# Patient Record
Sex: MALE | Race: WHITE | NOT HISPANIC OR LATINO | URBAN - METROPOLITAN AREA
[De-identification: names, ages, dates, MRNs, and addresses within clinical notes are randomized per-mention and may not be internally consistent; named-entity substitution may affect disease eponyms.]

---

## 2017-06-20 RX ORDER — APIXABAN 2.5 MG/1
1 TABLET, FILM COATED ORAL
Qty: 0 | Refills: 0 | COMMUNITY
Start: 2017-06-20

## 2017-07-07 ENCOUNTER — OUTPATIENT (OUTPATIENT)
Dept: OUTPATIENT SERVICES | Facility: HOSPITAL | Age: 82
LOS: 1 days | Discharge: HOME | End: 2017-07-07
Payer: MEDICARE

## 2017-07-07 DIAGNOSIS — Z96.641 PRESENCE OF RIGHT ARTIFICIAL HIP JOINT: Chronic | ICD-10-CM

## 2017-07-07 DIAGNOSIS — I48.1 PERSISTENT ATRIAL FIBRILLATION: ICD-10-CM

## 2017-07-07 DIAGNOSIS — Z96.651 PRESENCE OF RIGHT ARTIFICIAL KNEE JOINT: Chronic | ICD-10-CM

## 2017-07-07 DIAGNOSIS — Z87.828 PERSONAL HISTORY OF OTHER (HEALED) PHYSICAL INJURY AND TRAUMA: Chronic | ICD-10-CM

## 2017-07-07 DIAGNOSIS — I48.91 UNSPECIFIED ATRIAL FIBRILLATION: ICD-10-CM

## 2017-07-07 PROBLEM — Z00.00 ENCOUNTER FOR PREVENTIVE HEALTH EXAMINATION: Status: ACTIVE | Noted: 2017-07-07

## 2017-07-07 LAB
ALBUMIN SERPL ELPH-MCNC: 3.7 G/DL — SIGNIFICANT CHANGE UP (ref 3.3–5)
ALP SERPL-CCNC: 288 U/L — HIGH (ref 40–120)
ALT FLD-CCNC: 98 U/L — HIGH (ref 10–45)
ANION GAP SERPL CALC-SCNC: 12 MMOL/L — SIGNIFICANT CHANGE UP (ref 5–17)
APTT BLD: 32.4 SEC — SIGNIFICANT CHANGE UP (ref 27.5–37.4)
AST SERPL-CCNC: 34 U/L — SIGNIFICANT CHANGE UP (ref 10–40)
BILIRUB SERPL-MCNC: 0.7 MG/DL — SIGNIFICANT CHANGE UP (ref 0.2–1.2)
BUN SERPL-MCNC: 22 MG/DL — SIGNIFICANT CHANGE UP (ref 7–23)
CALCIUM SERPL-MCNC: 9.1 MG/DL — SIGNIFICANT CHANGE UP (ref 8.4–10.5)
CHLORIDE SERPL-SCNC: 93 MMOL/L — LOW (ref 96–108)
CO2 SERPL-SCNC: 30 MMOL/L — SIGNIFICANT CHANGE UP (ref 22–31)
CREAT SERPL-MCNC: 0.9 MG/DL — SIGNIFICANT CHANGE UP (ref 0.5–1.3)
GLUCOSE SERPL-MCNC: 89 MG/DL — SIGNIFICANT CHANGE UP (ref 70–99)
HCT VFR BLD CALC: 40.5 % — SIGNIFICANT CHANGE UP (ref 39–50)
HGB BLD-MCNC: 13.8 G/DL — SIGNIFICANT CHANGE UP (ref 13–17)
INR BLD: 1.58 — HIGH (ref 0.88–1.16)
MCHC RBC-ENTMCNC: 30.9 PG — SIGNIFICANT CHANGE UP (ref 27–34)
MCHC RBC-ENTMCNC: 34.1 G/DL — SIGNIFICANT CHANGE UP (ref 32–36)
MCV RBC AUTO: 90.8 FL — SIGNIFICANT CHANGE UP (ref 80–100)
PLATELET # BLD AUTO: 348 K/UL — SIGNIFICANT CHANGE UP (ref 150–400)
POTASSIUM SERPL-MCNC: 4.1 MMOL/L — SIGNIFICANT CHANGE UP (ref 3.5–5.3)
POTASSIUM SERPL-SCNC: 4.1 MMOL/L — SIGNIFICANT CHANGE UP (ref 3.5–5.3)
PROT SERPL-MCNC: 7.5 G/DL — SIGNIFICANT CHANGE UP (ref 6–8.3)
PROTHROM AB SERPL-ACNC: 17.7 SEC — HIGH (ref 9.8–12.7)
RBC # BLD: 4.46 M/UL — SIGNIFICANT CHANGE UP (ref 4.2–5.8)
RBC # FLD: 14.7 % — SIGNIFICANT CHANGE UP (ref 10.3–16.9)
SODIUM SERPL-SCNC: 135 MMOL/L — SIGNIFICANT CHANGE UP (ref 135–145)
WBC # BLD: 8.1 K/UL — SIGNIFICANT CHANGE UP (ref 3.8–10.5)
WBC # FLD AUTO: 8.1 K/UL — SIGNIFICANT CHANGE UP (ref 3.8–10.5)

## 2017-07-07 PROCEDURE — 92960 CARDIOVERSION ELECTRIC EXT: CPT

## 2017-07-07 PROCEDURE — 80053 COMPREHEN METABOLIC PANEL: CPT

## 2017-07-07 PROCEDURE — 93312 ECHO TRANSESOPHAGEAL: CPT | Mod: 26

## 2017-07-07 PROCEDURE — 93312 ECHO TRANSESOPHAGEAL: CPT

## 2017-07-07 PROCEDURE — 93320 DOPPLER ECHO COMPLETE: CPT | Mod: 26

## 2017-07-07 PROCEDURE — 85610 PROTHROMBIN TIME: CPT

## 2017-07-07 PROCEDURE — 85027 COMPLETE CBC AUTOMATED: CPT

## 2017-07-07 PROCEDURE — 93325 DOPPLER ECHO COLOR FLOW MAPG: CPT | Mod: 26

## 2017-07-07 PROCEDURE — 85730 THROMBOPLASTIN TIME PARTIAL: CPT

## 2017-07-07 RX ORDER — FUROSEMIDE 40 MG
1 TABLET ORAL
Qty: 0 | Refills: 0 | COMMUNITY

## 2017-07-07 RX ORDER — ATORVASTATIN CALCIUM 80 MG/1
1 TABLET, FILM COATED ORAL
Qty: 0 | Refills: 0 | COMMUNITY

## 2017-07-07 RX ORDER — TRAZODONE HCL 50 MG
1 TABLET ORAL
Qty: 0 | Refills: 0 | COMMUNITY

## 2017-07-07 RX ORDER — L.ACIDOPH/B.ANIMALIS/B.LONGUM 15B CELL
1 CAPSULE ORAL
Qty: 0 | Refills: 0 | COMMUNITY

## 2017-07-07 RX ORDER — DIPHENOXYLATE HCL/ATROPINE 2.5-.025MG
1 TABLET ORAL
Qty: 0 | Refills: 0 | COMMUNITY

## 2017-07-07 RX ORDER — BISOPROLOL FUMARATE 10 MG/1
2 TABLET, FILM COATED ORAL
Qty: 0 | Refills: 0 | COMMUNITY

## 2017-07-07 RX ORDER — CHOLECALCIFEROL (VITAMIN D3) 125 MCG
1 CAPSULE ORAL
Qty: 0 | Refills: 0 | COMMUNITY
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
## 2017-07-07 NOTE — H&P ADULT - NSHPSOCIALHISTORY_GEN_ALL_CORE
Patient lives with his wife Hellen. He is very active, plays golf, tennis etc.   Tobacco: Former social smoker (1/2 ppd for 5-10 years).   Etoh: social drinker (occasional glass of wine)  Drugs: Denies illicit drug use

## 2017-07-07 NOTE — H&P ADULT - NSHPLABSRESULTS_GEN_ALL_CORE
13.8   8.1   )-----------( 348      ( 07 Jul 2017 11:33 )             40.5   07-07    135  |  93<L>  |  22  ----------------------------<  89  4.1   |  30  |  0.90    Ca    9.1      07 Jul 2017 11:33    TPro  7.5  /  Alb  3.7  /  TBili  0.7  /  DBili  x   /  AST  34  /  ALT  98<H>  /  AlkPhos  288<H>  07-07    PT/INR - ( 07 Jul 2017 11:33 )   PT: 17.7 sec;   INR: 1.58     PTT - ( 07 Jul 2017 11:33 )  PTT:32.4 sec    CALRKE 07/07/17  Interpretation Summary  Normal left ventricular size and wall thickness. There is mild global  hypokinesis of the left ventricle. The left ventricular ejection fraction is mildly reduced. The left atrium is dilated. No clot seen in the left atrium or in the left atrial appendage. Left atrial appendage emptying velocities are reduced. The right atrium is dilated. The right ventricle is normal in size and  function. Structurally normal aortic valve. No aortic regurgitation noted. Structurally normal mitral valve. There is mild mitral regurgitation. Structurally normal tricuspid valve. Structurally normal pulmonic valve. Mild atherosclerotic plaque(s) in the aortic arch. Mild atherosclerotic plaque(s) in the descending aorta.

## 2017-07-07 NOTE — H&P ADULT - ASSESSMENT
Mr. Watkins is an active 82yo male with a past medical history of basal and squamous cell carcinoma, arthritis, hearing loss, and newly diagnosed atrial fibrillation (06/15) who presents today for CLARKE/ Cardioversion. Mr. Watkins is an active 82yo male with a past medical history of basal and squamous cell carcinoma, arthritis, hearing loss, and newly diagnosed  persistent atrial fibrillation (06/15) who presents today for CLARKE/ Cardioversion.

## 2017-07-07 NOTE — H&P ADULT - HISTORY OF PRESENT ILLNESS
Mr. Watkins is an active 82yo male with a past medical history of arthritis, hearing loss, and newly diagnosed atrial fibrillation (06/15)who presents today for CLARKE/ Cardioversion.     The patient was in his usual state of health until 06/15/17 when he experienced flu-like symptoms including fever, chills, rigors, malaise. He also felt fatigue, dyspnea on exertion, and just generally unwell. He went to his PCP and was found to be atrial fibrillation. Mr. Watkins is an active 84yo male with a past medical history of arthritis, hearing loss, and newly diagnosed atrial fibrillation (06/15) who presents today for CLARKE/ Cardioversion.     The patient was in his usual state of health until 06/15/17 when he experienced flu-like symptoms including fever, chills, rigors, malaise and severe headache. He also felt fatigue and increasing dyspnea on exertion. He went to his PCP and was found to be atrial fibrillation. He was started on Eliquis at that time and has been compliant with this medication. His PCP is in the process of a work-up for possible Lyme disease infection.    The patient denies palpitations, syncope, near syncope, chest pain or pressure, dizziness, light-headedness. The patient also denies history of hypertension and hyperlipidemia, despite currently being precribed a statin and Mr. Watkins is an active 82yo male with a past medical history of basal and squamous cell carcinoma, arthritis, hearing loss, and newly diagnosed atrial fibrillation (06/15) who presents today for CLARKE/ Cardioversion.     The patient was in his usual state of health until 06/15/17 when he experienced flu-like symptoms including fever, chills, rigors, malaise and severe headache. He also felt fatigue and increasing dyspnea on exertion. He went to his PCP and was found to be in atrial fibrillation. He was started on Cefuroxime, Diltiazem, Eliquis and prednisone at that time and has been compliant with all medications. His PCP is in the process of a work-up for possible Lyme disease infection. Prior to 06/15 the patient was very active, played golf, tennis, and hiking regularly. Since 06/15 the patient has been limited in his exertional tolerance and now feels deconditioned and weak.     The patient denies palpitations, syncope, near syncope, chest pain or pressure, dizziness, and/ or light-headedness. The patient also denies history of hypertension, hyperlipidemia, and constipation despite currently being prescribed a beta-blocker, statin, and lomotil, respectively. Mr. Watkins is an active 82 yo male with a past medical history of basal and squamous cell carcinoma, arthritis, hearing loss, and newly diagnosed atrial fibrillation (06/15) who presents today for CLARKE/ Cardioversion.     The patient was in his usual state of health until 06/15/17 when he experienced flu-like symptoms including fever, chills, rigors, malaise and severe headache. He also felt fatigue and increasing dyspnea on exertion. He went to his PCP and was found to be in atrial fibrillation. He was started on Cefuroxime, Diltiazem, Eliquis and prednisone at that time and has been compliant with all medications. His PCP is in the process of a work-up for possible Lyme disease infection. Prior to 06/15 the patient was very active, played golf, tennis, and hiking regularly. Since 06/15 the patient has been limited in his exertional tolerance and now feels deconditioned and weak.     The patient denies palpitations, syncope, near syncope, chest pain or pressure, dizziness, and/ or light-headedness. The patient also denies history of hypertension, hyperlipidemia, and constipation despite currently being prescribed a beta-blocker, statin, and lomotil, respectively.

## 2017-07-07 NOTE — H&P ADULT - OPHTHALMOLOGIC COMMENTS
Right eyes - floater, Left sided mild vision loss (states that vision is "darker in left eye") Right eye - floater, Left eye mild vision loss (states that vision is "darker in left eye")

## 2017-07-07 NOTE — H&P ADULT - FAMILY HISTORY
Father  Still living? Unknown  Family history of myocardial infarction in first degree male relative, Age at diagnosis: Age Unknown     Mother  Still living? Unknown  Family history of Parkinson's disease, Age at diagnosis: Age Unknown

## 2017-08-17 ENCOUNTER — APPOINTMENT (OUTPATIENT)
Dept: HEART AND VASCULAR | Facility: CLINIC | Age: 82
End: 2017-08-17
Payer: MEDICARE

## 2017-08-17 VITALS
BODY MASS INDEX: 21.42 KG/M2 | HEART RATE: 57 BPM | SYSTOLIC BLOOD PRESSURE: 139 MMHG | WEIGHT: 153 LBS | HEIGHT: 71 IN | DIASTOLIC BLOOD PRESSURE: 71 MMHG

## 2017-08-17 DIAGNOSIS — Z87.891 PERSONAL HISTORY OF NICOTINE DEPENDENCE: ICD-10-CM

## 2017-08-17 DIAGNOSIS — I48.0 PAROXYSMAL ATRIAL FIBRILLATION: ICD-10-CM

## 2017-08-17 DIAGNOSIS — Z78.9 OTHER SPECIFIED HEALTH STATUS: ICD-10-CM

## 2017-08-17 PROCEDURE — 93000 ELECTROCARDIOGRAM COMPLETE: CPT

## 2017-08-17 PROCEDURE — 99215 OFFICE O/P EST HI 40 MIN: CPT

## 2017-08-22 PROBLEM — Z87.891 FORMER SMOKER: Status: ACTIVE | Noted: 2017-08-22

## 2017-08-22 PROBLEM — I48.0 PAROXYSMAL ATRIAL FIBRILLATION: Status: ACTIVE | Noted: 2017-08-22

## 2017-08-22 PROBLEM — Z78.9 SOCIAL ALCOHOL USE: Status: ACTIVE | Noted: 2017-08-22

## 2017-08-22 RX ORDER — BISOPROLOL FUMARATE 5 MG/1
5 TABLET, FILM COATED ORAL
Refills: 0 | Status: ACTIVE | COMMUNITY
Start: 2017-08-22

## 2017-08-22 RX ORDER — APIXABAN 5 MG/1
5 TABLET, FILM COATED ORAL
Refills: 0 | Status: ACTIVE | COMMUNITY
Start: 2017-08-22

## 2017-08-22 RX ORDER — FUROSEMIDE 20 MG/1
20 TABLET ORAL
Refills: 0 | Status: ACTIVE | COMMUNITY
Start: 2017-08-22

## 2017-08-22 RX ORDER — TRAZODONE HYDROCHLORIDE 50 MG/1
50 TABLET ORAL
Refills: 0 | Status: ACTIVE | COMMUNITY
Start: 2017-08-22

## 2017-08-22 RX ORDER — ATORVASTATIN CALCIUM 10 MG/1
10 TABLET, FILM COATED ORAL
Refills: 0 | Status: ACTIVE | COMMUNITY
Start: 2017-08-22

## 2017-12-04 ENCOUNTER — APPOINTMENT (OUTPATIENT)
Dept: HEART AND VASCULAR | Facility: CLINIC | Age: 82
End: 2017-12-04
Payer: MEDICARE

## 2017-12-04 VITALS
DIASTOLIC BLOOD PRESSURE: 80 MMHG | HEART RATE: 77 BPM | BODY MASS INDEX: 21.7 KG/M2 | WEIGHT: 155 LBS | HEIGHT: 71 IN | SYSTOLIC BLOOD PRESSURE: 145 MMHG

## 2017-12-04 PROCEDURE — 93000 ELECTROCARDIOGRAM COMPLETE: CPT

## 2017-12-04 PROCEDURE — 99215 OFFICE O/P EST HI 40 MIN: CPT

## 2017-12-05 ENCOUNTER — OUTPATIENT (OUTPATIENT)
Dept: OUTPATIENT SERVICES | Facility: HOSPITAL | Age: 82
LOS: 1 days | Discharge: HOME | End: 2017-12-05
Payer: MEDICARE

## 2017-12-05 DIAGNOSIS — Z87.828 PERSONAL HISTORY OF OTHER (HEALED) PHYSICAL INJURY AND TRAUMA: Chronic | ICD-10-CM

## 2017-12-05 DIAGNOSIS — Z96.641 PRESENCE OF RIGHT ARTIFICIAL HIP JOINT: Chronic | ICD-10-CM

## 2017-12-05 DIAGNOSIS — Z96.651 PRESENCE OF RIGHT ARTIFICIAL KNEE JOINT: Chronic | ICD-10-CM

## 2017-12-05 PROCEDURE — 33282: CPT

## 2017-12-05 PROCEDURE — C1764: CPT

## 2017-12-05 NOTE — PROGRESS NOTE ADULT - SUBJECTIVE AND OBJECTIVE BOX
EPS Progress Note    S: 82 yo M with history of arthritis, paroxysmal atrial fibrillation s/p DCCV, squamous cell carcinoma presents for ILR implant     MEDICATIONS  (STANDING):  Eliquis, Bioprolol, Lipitor           General:  NAD         Chest:  CTA B/L     ecchymosis to R anterior chest wall   Cardiac:  RRR      s1/s2     Abdomen:  +BS      Soft      Non-Tender      Non-Distended       Extremities:  no edema                                     Assessment/Plan:  82 yo M with history of arthritis, paroxysmal atrial fibrillation s/p DCCV, squamous cell carcinoma presents for ILR implant  Will discharge home today if stable

## 2018-01-18 ENCOUNTER — APPOINTMENT (OUTPATIENT)
Dept: HEART AND VASCULAR | Facility: CLINIC | Age: 83
End: 2018-01-18
Payer: MEDICARE

## 2018-01-18 VITALS
WEIGHT: 156 LBS | SYSTOLIC BLOOD PRESSURE: 185 MMHG | HEART RATE: 72 BPM | DIASTOLIC BLOOD PRESSURE: 101 MMHG | BODY MASS INDEX: 21.84 KG/M2 | HEIGHT: 71 IN

## 2018-01-18 PROCEDURE — 93291 INTERROG DEV EVAL SCRMS IP: CPT

## 2018-06-06 ENCOUNTER — APPOINTMENT (OUTPATIENT)
Dept: HEART AND VASCULAR | Facility: CLINIC | Age: 83
End: 2018-06-06
Payer: MEDICARE

## 2018-06-06 VITALS
SYSTOLIC BLOOD PRESSURE: 155 MMHG | BODY MASS INDEX: 22.12 KG/M2 | HEART RATE: 78 BPM | WEIGHT: 158 LBS | HEIGHT: 71 IN | DIASTOLIC BLOOD PRESSURE: 87 MMHG

## 2018-06-06 PROCEDURE — 93291 INTERROG DEV EVAL SCRMS IP: CPT

## 2018-12-06 ENCOUNTER — APPOINTMENT (OUTPATIENT)
Dept: HEART AND VASCULAR | Facility: CLINIC | Age: 83
End: 2018-12-06

## 2021-12-07 NOTE — H&P ADULT - PROBLEM SELECTOR PLAN 1
negative
- Patient with recently diagnosed atrial fibrillation in the setting of other acute onset flu-like illness, from which he is still recovering.   - Plan for CLARKE/ DCCV today.  - Discussed cardioversion in detail with patient. He verbalized understanding and wishes to proceed as planned. Signed consent form placed in patient's chart.  - Discontinue Diltiazem and Cefuroxime today.  - Continue Eliquis 5mg BID for stroke prophylaxis ([id number] Score = 3)  - Continue all other home medications as noted above  - Patient requested to follow-up with Dr. Ryan. Advised to make follow-up appointment for 3-4 weeks. Contact info for EP clinic given to patient's wife, Hellen.